# Patient Record
Sex: FEMALE | Race: WHITE
[De-identification: names, ages, dates, MRNs, and addresses within clinical notes are randomized per-mention and may not be internally consistent; named-entity substitution may affect disease eponyms.]

---

## 2018-04-19 ENCOUNTER — HOSPITAL ENCOUNTER (OUTPATIENT)
Dept: HOSPITAL 7 - FB.SDS | Age: 53
Discharge: HOME | End: 2018-04-19
Attending: SURGERY
Payer: COMMERCIAL

## 2018-04-19 DIAGNOSIS — D12.2: ICD-10-CM

## 2018-04-19 DIAGNOSIS — Z12.11: Primary | ICD-10-CM

## 2018-04-19 DIAGNOSIS — Z79.899: ICD-10-CM

## 2018-04-19 PROCEDURE — 88305 TISSUE EXAM BY PATHOLOGIST: CPT

## 2018-04-19 PROCEDURE — 45385 COLONOSCOPY W/LESION REMOVAL: CPT

## 2018-04-19 NOTE — PCM.OPNOTE
- General Post-Op/Procedure Note


Date of Surgery/Procedure: 04/19/18


Operative Procedure(s): C scope with bx


Findings: 





ascending colon polyp


sessile 





Pre Op Diagnosis: colon cancer screening


Post-Op Diagnosis: ascending colon polyp.  sessile


Anesthesia Technique: MAC


Primary Surgeon: Koffi Tsang


Anesthesia Provider: Jaelyn Mayberry


Pathology: 








ascending colon polyp


sessile 





Complications: None


Condition: Good


Free Text/Narrative:: 





see dictation

## 2018-04-19 NOTE — OR
DATE OF OPERATION:  04/19/2018

 

SURGEON:  Koffi Tsang MD

 

PROCEDURE PERFORMED:  Colonoscopy with hot loop and cold forceps biopsy.

 

PREOPERATIVE DIAGNOSIS:  Colon cancer screening.

 

POSTOPERATIVE DIAGNOSIS:  Ascending colon polyp.

 

INDICATIONS FOR PROCEDURE:  This is a 52-year-old white female who presents for

her initial screening colonoscopy.  She was offered and accepted same.

 

DESCRIPTION OF OPERATION:  After an excellent IV sedation was administered,

digital rectal exam was performed.  No marked abnormality was noted.  The

flexible colonoscope was inserted and advanced to the cecum without difficulty.

The prep was excellent.  The following findings were noted.  Ascending colon; at

the distal ascending colon, along the line of a fold, there was a 1.5 cm polyp,

which was biopsied with hot loop snare and cold forceps biopsy.  The area was

also tattooed given the sessile nature of the lesion.  The transverse colon was

unremarkable.  Descending colon was unremarkable.  Sigmoid and rectum were

unremarkable.  Colon was deflated.  The scope was removed.  She tolerated the

procedure well and was taken to Recovery in a good condition.

 

Results, by letter.

 

Job#: [id number]

DD: 04/19/2018 0815

DT: 04/19/2018 0923 AS/MODL